# Patient Record
Sex: MALE | URBAN - NONMETROPOLITAN AREA
[De-identification: names, ages, dates, MRNs, and addresses within clinical notes are randomized per-mention and may not be internally consistent; named-entity substitution may affect disease eponyms.]

---

## 2023-10-24 ENCOUNTER — TELEPHONE (OUTPATIENT)
Dept: GASTROENTEROLOGY | Facility: CLINIC | Age: 48
End: 2023-10-24

## 2023-10-24 NOTE — TELEPHONE ENCOUNTER
Attempted to contact Zelalem Betancourt 1975 regarding the appointment no show with RAYMOND Ji on 10/24/23 @ 9:45 am Patient is aware that there is a 24-hour cancellation policy and understands that a no-show letter will be mailed to them at the address on file. Unable to leave message-invalid number